# Patient Record
Sex: FEMALE | Race: WHITE | Employment: PART TIME | URBAN - METROPOLITAN AREA
[De-identification: names, ages, dates, MRNs, and addresses within clinical notes are randomized per-mention and may not be internally consistent; named-entity substitution may affect disease eponyms.]

---

## 2019-07-29 ENCOUNTER — TRANSFERRED RECORDS (OUTPATIENT)
Dept: HEALTH INFORMATION MANAGEMENT | Facility: CLINIC | Age: 34
End: 2019-07-29

## 2020-03-02 ENCOUNTER — OFFICE VISIT (OUTPATIENT)
Dept: PODIATRY | Facility: CLINIC | Age: 35
End: 2020-03-02
Payer: COMMERCIAL

## 2020-03-02 VITALS
HEIGHT: 68 IN | OXYGEN SATURATION: 100 % | WEIGHT: 139 LBS | HEART RATE: 74 BPM | BODY MASS INDEX: 21.07 KG/M2 | DIASTOLIC BLOOD PRESSURE: 64 MMHG | SYSTOLIC BLOOD PRESSURE: 102 MMHG

## 2020-03-02 DIAGNOSIS — M77.8 CAPSULITIS OF LEFT FOOT: Primary | ICD-10-CM

## 2020-03-02 PROCEDURE — 99202 OFFICE O/P NEW SF 15 MIN: CPT | Performed by: PODIATRIST

## 2020-03-02 RX ORDER — CLINDAMYCIN PHOSPHATE 11.9 MG/ML
SOLUTION TOPICAL
COMMUNITY
Start: 2019-07-23 | End: 2022-11-09

## 2020-03-02 ASSESSMENT — MIFFLIN-ST. JEOR: SCORE: 1379

## 2020-03-02 ASSESSMENT — PAIN SCALES - GENERAL: PAINLEVEL: NO PAIN (1)

## 2020-03-02 NOTE — PROGRESS NOTES
No past medical history on file.  There is no problem list on file for this patient.    No past surgical history on file.  Social History     Socioeconomic History     Marital status:      Spouse name: Not on file     Number of children: Not on file     Years of education: Not on file     Highest education level: Not on file   Occupational History     Not on file   Social Needs     Financial resource strain: Not on file     Food insecurity:     Worry: Not on file     Inability: Not on file     Transportation needs:     Medical: Not on file     Non-medical: Not on file   Tobacco Use     Smoking status: Not on file   Substance and Sexual Activity     Alcohol use: Not on file     Drug use: Not on file     Sexual activity: Not on file   Lifestyle     Physical activity:     Days per week: Not on file     Minutes per session: Not on file     Stress: Not on file   Relationships     Social connections:     Talks on phone: Not on file     Gets together: Not on file     Attends Sikh service: Not on file     Active member of club or organization: Not on file     Attends meetings of clubs or organizations: Not on file     Relationship status: Not on file     Intimate partner violence:     Fear of current or ex partner: Not on file     Emotionally abused: Not on file     Physically abused: Not on file     Forced sexual activity: Not on file   Other Topics Concern     Not on file   Social History Narrative     Not on file     No family history on file.  SUBJECTIVE FINDINGS:  A 35-year-old female presents for left foot pain.  She relates it has been going on for about 2 years.  It kind of hurts in the third and fourth, maybe the fifth toe and maybe the fifth MPJ.  She relates it comes and goes.  No injuries.  If she wears a tight shoe or a shoe that is narrow, that makes it worse.  She tried a toe sleeve that did not really help much.  She has seen her primary physician, who took x-rays and thought maybe she had a  agustín.      OBJECTIVE FINDINGS:  DP and PT are 2/4 left.  She has functional hallux limitus with a dorsal first MPJ prominence.  She has a dorsal mid-foot prominence.  She has dorsally-contracted digits 2-5.  There are no gross tendon voids.  No erythema, no drainage, no odor, no calor.  She has no palpable click or shooting pain in the interspaces with pinch and squeeze test on the left.  She does have some mild pain on palpation of the plantar fourth MPJ.  There is no pain on range of motion.  No gross tendon voids.  X-rays from 07/29/2019 were reviewed with patient in clinic today.  There is no fracture.  Joint cortical margins are intact.      ASSESSMENT AND PLAN:  Capsulitis, left fourth MPJ.  She does have functional hallux limitus and hammertoes present.  Diagnosis and treatment options discussed with the patient.   Spenco over-the-counter insoles advised and use discussed with her.  Metatarsal pads dispensed and use discussed with her.  She is advised on stretching and shoe gear.  Return to clinic and see me as needed.

## 2020-03-02 NOTE — NURSING NOTE
"Michelle Espino's chief complaint for this visit includes:  Chief Complaint   Patient presents with     Left Foot - Pain     Present for 2 years.     PCP: Warner Ansari    Referring Provider:  No referring provider defined for this encounter.    /64 (BP Location: Left arm, Patient Position: Sitting, Cuff Size: Adult Regular)   Pulse 74   Ht 1.727 m (5' 8\")   Wt 63 kg (139 lb)   SpO2 100%   BMI 21.13 kg/m    No Pain (1)     Do you need any medication refills at today's visit? No    Inga Flores CMA        "

## 2020-03-02 NOTE — LETTER
3/2/2020         RE: Michelle Espino  [address] [place] [address]        Dear Colleague,    Thank you for referring your patient, Michelle Espino, to the [facility]. Please see a copy of my visit note below.    No past medical history on file.  There is no problem list on file for this patient.    No past surgical history on file.  Social History     Socioeconomic History     Marital status:      Spouse name: Not on file     Number of children: Not on file     Years of education: Not on file     Highest education level: Not on file   Occupational History     Not on file   Social Needs     Financial resource strain: Not on file     Food insecurity:     Worry: Not on file     Inability: Not on file     Transportation needs:     Medical: Not on file     Non-medical: Not on file   Tobacco Use     Smoking status: Not on file   Substance and Sexual Activity     Alcohol use: Not on file     Drug use: Not on file     Sexual activity: Not on file   Lifestyle     Physical activity:     Days per week: Not on file     Minutes per session: Not on file     Stress: Not on file   Relationships     Social connections:     Talks on phone: Not on file     Gets together: Not on file     Attends Bahai service: Not on file     Active member of club or organization: Not on file     Attends meetings of clubs or organizations: Not on file     Relationship status: Not on file     Intimate partner violence:     Fear of current or ex partner: Not on file     Emotionally abused: Not on file     Physically abused: Not on file     Forced sexual activity: Not on file   Other Topics Concern     Not on file   Social History Narrative     Not on file     No family history on file.  SUBJECTIVE FINDINGS:  A 35-year-old female presents for left foot pain.  She relates it has been going on for about 2 years.  It kind of hurts in the third and fourth, maybe the fifth toe and maybe the fifth MPJ.  She relates it comes  and goes.  No injuries.  If she wears a tight shoe or a shoe that is narrow, that makes it worse.  She tried a toe sleeve that did not really help much.  She has seen her primary physician, who took x-rays and thought maybe she had a bunion.      OBJECTIVE FINDINGS:  DP and PT are 2/4 left.  She has functional hallux limitus with a dorsal first MPJ prominence.  She has a dorsal mid-foot prominence.  She has dorsally-contracted digits 2-5.  There are no gross tendon voids.  No erythema, no drainage, no odor, no calor.  She has no palpable click or shooting pain in the interspaces with pinch and squeeze test on the left.  She does have some mild pain on palpation of the plantar fourth MPJ.  There is no pain on range of motion.  No gross tendon voids.  X-rays from 07/29/2019 were reviewed with patient in clinic today.  There is no fracture.  Joint cortical margins are intact.      ASSESSMENT AND PLAN:  Capsulitis, left fourth MPJ.  She does have functional hallux limitus and hammertoes present.  Diagnosis and treatment options discussed with the patient.   Spenco over-the-counter insoles advised and use discussed with her.  Metatarsal pads dispensed and use discussed with her.  She is advised on stretching and shoe gear.  Return to clinic and see me as needed.           Again, thank you for allowing me to participate in the care of your patient.        Sincerely,        Adrian George DPM

## 2020-11-17 ENCOUNTER — TRANSFERRED RECORDS (OUTPATIENT)
Dept: HEALTH INFORMATION MANAGEMENT | Facility: CLINIC | Age: 35
End: 2020-11-17

## 2020-11-20 ENCOUNTER — OFFICE VISIT (OUTPATIENT)
Dept: OTOLARYNGOLOGY | Facility: CLINIC | Age: 35
End: 2020-11-20
Payer: COMMERCIAL

## 2020-11-20 VITALS
HEART RATE: 74 BPM | HEIGHT: 69 IN | BODY MASS INDEX: 18.51 KG/M2 | DIASTOLIC BLOOD PRESSURE: 75 MMHG | RESPIRATION RATE: 20 BRPM | WEIGHT: 125 LBS | SYSTOLIC BLOOD PRESSURE: 111 MMHG | OXYGEN SATURATION: 100 %

## 2020-11-20 DIAGNOSIS — J41.1 MUCOPURULENT CHRONIC BRONCHITIS (H): Primary | ICD-10-CM

## 2020-11-20 DIAGNOSIS — D10.5 PAPILLOMA OF OROPHARYNX: ICD-10-CM

## 2020-11-20 DIAGNOSIS — J35.01 CHRONIC TONSILLITIS: ICD-10-CM

## 2020-11-20 PROCEDURE — 99203 OFFICE O/P NEW LOW 30 MIN: CPT | Performed by: OTOLARYNGOLOGY

## 2020-11-20 ASSESSMENT — ENCOUNTER SYMPTOMS
SORE THROAT: 0
STRIDOR: 0
HEMOPTYSIS: 0
HEARTBURN: 0
TINGLING: 0
BLURRED VISION: 0
DOUBLE VISION: 0
VOMITING: 0
COUGH: 1
HEADACHES: 0
SHORTNESS OF BREATH: 0
CONSTITUTIONAL NEGATIVE: 1
SINUS PAIN: 0
BRUISES/BLEEDS EASILY: 0
WHEEZING: 0
DIZZINESS: 0
NAUSEA: 0
TREMORS: 0
SPUTUM PRODUCTION: 1

## 2020-11-20 ASSESSMENT — MIFFLIN-ST. JEOR: SCORE: 1331.38

## 2020-11-20 ASSESSMENT — PAIN SCALES - GENERAL: PAINLEVEL: MILD PAIN (3)

## 2020-11-20 NOTE — PROGRESS NOTES
HPI    This pleasant patient is having productive cough episodes and bring up a small colored calcified tissue for the past 3-4 months. Coughing episodes occur all of a sudden and ends up with a calcified production. States sore throat. Denies any fever, shortness of breath, weight changes, voice changes, dysphagia or odynophagia. 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 Denies any environmental allergies, head and neck surgeries or tonsil infections.    Review of Systems   Constitutional: Negative.    HENT: Negative for congestion, ear discharge, ear pain, hearing loss, nosebleeds, sinus pain, sore throat and tinnitus.    Eyes: Negative for blurred vision and double vision.   Respiratory: Positive for cough and sputum production. Negative for hemoptysis, shortness of breath, wheezing and stridor.    Gastrointestinal: Negative for heartburn, nausea and vomiting.   Skin: Negative.    Neurological: Negative for dizziness, tingling, tremors and headaches.   Endo/Heme/Allergies: Negative for environmental allergies. Does not bruise/bleed easily.         Physical Exam  Vitals signs reviewed.   HENT:      Head: Normocephalic and atraumatic.      Jaw: There is normal jaw occlusion.      Right Ear: Hearing, tympanic membrane, ear canal and external ear normal. No decreased hearing noted. No middle ear effusion. There is no impacted cerumen.      Left Ear: Hearing, tympanic membrane, ear canal and external ear normal. No decreased hearing noted.  No middle ear effusion. There is no impacted cerumen.      Nose: Nose normal. No congestion or rhinorrhea.      Mouth/Throat:      Mouth: Mucous membranes are moist.      Tongue: No lesions. Tongue does not deviate from midline.      Palate: No mass and lesions.      Pharynx: Uvula midline. No pharyngeal swelling, oropharyngeal exudate, posterior oropharyngeal erythema or uvula swelling.      Tonsils: No  tonsillar exudate or tonsillar abscesses.   Eyes:      Extraocular Movements: Extraocular movements intact.      Pupils: Pupils are equal, round, and reactive to light.   Neurological:      Mental Status: She is alert.       Bilateral papillomatous growth located at the upper pole of the tonsillar region.  No acute infection.    A/P  This pleasant patient is likely having tonsil stones and she has bilateral papillary epithelial growths at the upper poles of the tonsillar cavity. Options were discussed. I will refer her to a Pulmonologist to r/o any asthma or bronchitis first and see her recent upper endoscopy note. She will be scheduled for bilateral tonsillectomy+ oropharyngeal lesion removal. Pros and cons of the surgery were explained. Her questions were answered. Greater than 30 minutes were spent on this visit 50% of which were spent on counseling and coordinating of care.

## 2020-11-20 NOTE — NURSING NOTE
"Michelle Espino's goals for this visit include:   Chief Complaint   Patient presents with     Consult     Pt said has had sore throat on and off for about 4 months. Has coughed up chunks too- brown in color- like tonsil stones. Was told by another ENT who did a scope that she has \"textbook squamous papiloma\"      She requests these members of her care team be copied on today's visit information:     PCP: Warner Ansari    Referring Provider:  No referring provider defined for this encounter.    /75 (BP Location: Right arm, Patient Position: Sitting, Cuff Size: Adult Regular)   Pulse 74   Resp 20   Ht 1.753 m (5' 9\")   Wt 56.7 kg (125 lb)   SpO2 100%   BMI 18.46 kg/m      Do you need any medication refills at today's visit? No    Janie Tuttle LPN      "

## 2020-11-20 NOTE — LETTER
11/20/2020         RE: Michelle Espino  [address] [place] [address]        Dear Colleague,    Thank you for referring your patient, Michelle Espino, to the [facility]. Please see a copy of my visit note below.    HPI    This pleasant patient is having productive cough episodes and bring up a small colored calcified tissue for the past 3-4 months. Coughing episodes occur all of a sudden and ends up with a calcified production. States sore throat. Denies any fever, shortness of breath, weight changes, voice changes, dysphagia or odynophagia. 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 Denies any environmental allergies, head and neck surgeries or tonsil infections.    Review of Systems   Constitutional: Negative.    HENT: Negative for congestion, ear discharge, ear pain, hearing loss, nosebleeds, sinus pain, sore throat and tinnitus.    Eyes: Negative for blurred vision and double vision.   Respiratory: Positive for cough and sputum production. Negative for hemoptysis, shortness of breath, wheezing and stridor.    Gastrointestinal: Negative for heartburn, nausea and vomiting.   Skin: Negative.    Neurological: Negative for dizziness, tingling, tremors and headaches.   Endo/Heme/Allergies: Negative for environmental allergies. Does not bruise/bleed easily.         Physical Exam  Vitals signs reviewed.   HENT:      Head: Normocephalic and atraumatic.      Jaw: There is normal jaw occlusion.      Right Ear: Hearing, tympanic membrane, ear canal and external ear normal. No decreased hearing noted. No middle ear effusion. There is no impacted cerumen.      Left Ear: Hearing, tympanic membrane, ear canal and external ear normal. No decreased hearing noted.  No middle ear effusion. There is no impacted cerumen.      Nose: Nose normal. No congestion or rhinorrhea.      Mouth/Throat:      Mouth: Mucous membranes are moist.       Tongue: No lesions. Tongue does not deviate from midline.      Palate: No mass and lesions.      Pharynx: Uvula midline. No pharyngeal swelling, oropharyngeal exudate, posterior oropharyngeal erythema or uvula swelling.      Tonsils: No tonsillar exudate or tonsillar abscesses.   Eyes:      Extraocular Movements: Extraocular movements intact.      Pupils: Pupils are equal, round, and reactive to light.   Neurological:      Mental Status: She is alert.       Bilateral papillomatous growth located at the upper pole of the tonsillar region.  No acute infection.    A/P  This pleasant patient is likely having tonsil stones and she has bilateral papillary epithelial growths at the upper poles of the tonsillar cavity. Options were discussed. I will refer her to a Pulmonologist to r/o any asthma or bronchitis first and see her recent upper endoscopy note. She will be scheduled for bilateral tonsillectomy+ oropharyngeal lesion removal. Pros and cons of the surgery were explained. Her questions were answered. Greater than 30 minutes were spent on this visit 50% of which were spent on counseling and coordinating of care.          Again, thank you for allowing me to participate in the care of your patient.        Sincerely,        Rikki Arshad MD

## 2020-12-30 ENCOUNTER — OFFICE VISIT (OUTPATIENT)
Dept: NURSING | Facility: CLINIC | Age: 35
End: 2020-12-30
Payer: COMMERCIAL

## 2020-12-30 VITALS — HEART RATE: 73 BPM | WEIGHT: 124.4 LBS | BODY MASS INDEX: 18.37 KG/M2 | OXYGEN SATURATION: 95 %

## 2020-12-30 DIAGNOSIS — R05.9 COUGH: Primary | ICD-10-CM

## 2020-12-30 PROCEDURE — 99207 PR DROP WITH A PROCEDURE: CPT | Performed by: INTERNAL MEDICINE

## 2020-12-30 PROCEDURE — 94726 PLETHYSMOGRAPHY LUNG VOLUMES: CPT | Performed by: INTERNAL MEDICINE

## 2020-12-30 PROCEDURE — 94729 DIFFUSING CAPACITY: CPT | Performed by: INTERNAL MEDICINE

## 2020-12-30 PROCEDURE — 94060 EVALUATION OF WHEEZING: CPT | Performed by: INTERNAL MEDICINE

## 2021-01-04 ENCOUNTER — HEALTH MAINTENANCE LETTER (OUTPATIENT)
Age: 36
End: 2021-01-04

## 2021-01-04 LAB
DLCOUNC-%PRED-PRE: 90 %
DLCOUNC-PRE: 23.1 ML/MIN/MMHG
DLCOUNC-PRED: 25.59 ML/MIN/MMHG
ERV-%PRED-PRE: 101 %
ERV-PRE: 1.61 L
ERV-PRED: 1.59 L
EXPTIME-PRE: 6.81 SEC
FEF2575-%PRED-POST: 73 %
FEF2575-%PRED-PRE: 64 %
FEF2575-POST: 2.76 L/SEC
FEF2575-PRE: 2.41 L/SEC
FEF2575-PRED: 3.75 L/SEC
FEFMAX-%PRED-PRE: 79 %
FEFMAX-PRE: 6.13 L/SEC
FEFMAX-PRED: 7.69 L/SEC
FEV1-%PRED-PRE: 78 %
FEV1-PRE: 2.84 L
FEV1FEV6-PRE: 77 %
FEV1FEV6-PRED: 85 %
FEV1FVC-PRE: 77 %
FEV1FVC-PRED: 83 %
FEV1SVC-PRE: 78 %
FEV1SVC-PRED: 83 %
FIFMAX-PRE: 2.81 L/SEC
FRCPLETH-%PRED-PRE: 104 %
FRCPLETH-PRE: 3.06 L
FRCPLETH-PRED: 2.92 L
FVC-%PRED-PRE: 84 %
FVC-PRE: 3.7 L
FVC-PRED: 4.36 L
IC-%PRED-PRE: 73 %
IC-PRE: 2.04 L
IC-PRED: 2.77 L
RVPLETH-%PRED-PRE: 86 %
RVPLETH-PRE: 1.45 L
RVPLETH-PRED: 1.68 L
TLCPLETH-%PRED-PRE: 90 %
TLCPLETH-PRE: 5.1 L
TLCPLETH-PRED: 5.65 L
VA-%PRED-PRE: 84 %
VA-PRE: 4.92 L
VC-%PRED-PRE: 83 %
VC-PRE: 3.65 L
VC-PRED: 4.36 L

## 2021-01-11 ENCOUNTER — TELEPHONE (OUTPATIENT)
Dept: PULMONOLOGY | Facility: CLINIC | Age: 36
End: 2021-01-11

## 2021-01-11 NOTE — TELEPHONE ENCOUNTER
Contacted [facility] @ [phone]. Requested CXR from 11/10/2020 be pushed to  PACS. Images should be available this afternoon for review per imaging staff. Report is available in Care Everywhere.      Emily Sandhu LPN  Pulmonary Division:  [facility]  Phone: [phone] Fax: [phone]

## 2021-01-13 ENCOUNTER — VIRTUAL VISIT (OUTPATIENT)
Dept: PULMONOLOGY | Facility: CLINIC | Age: 36
End: 2021-01-13
Attending: OTOLARYNGOLOGY
Payer: COMMERCIAL

## 2021-01-13 DIAGNOSIS — R05.3 CHRONIC COUGH: Primary | ICD-10-CM

## 2021-01-13 DIAGNOSIS — J41.1 MUCOPURULENT CHRONIC BRONCHITIS (H): ICD-10-CM

## 2021-01-13 PROCEDURE — 99204 OFFICE O/P NEW MOD 45 MIN: CPT | Mod: 95 | Performed by: INTERNAL MEDICINE

## 2021-01-13 RX ORDER — AZELAIC ACID 0.15 G/G
AEROSOL, FOAM TOPICAL
COMMUNITY
Start: 2020-12-03 | End: 2022-11-09

## 2021-01-13 NOTE — PATIENT INSTRUCTIONS
Follow up with me virtually in 3 months. You may contact the pulmonary clinic at [phone] in order to schedule a follow up.

## 2021-01-13 NOTE — PROGRESS NOTES
"Michelle is a 35 year old who is being evaluated via a billable video visit.      How would you like to obtain your AVS? MyChart  If the video visit is dropped, the invitation should be resent by: Text to cell phone: Mobile  Will anyone else be joining your video visit? No      Video Start Time: 8:45 AM      Subjective     iMchelle is a 35 year old who presents to clinic today for chronic productive cough        HPI   I have the pleasure of seeing Ms. Michelle Espino, who is a very pleasant 35 yr old female who presents with complains of productive cough for about 4 months. Cough episodes are somewhat explosive, occurs suddenly and are productive of very dark brown thick phlegm with occasional calcified tissues. This is usually accompanied by recurrent sore throat and a \"feeling of something in her throat\". She was treated in the past with antibiotics with no improvement.   She denies any fever, wheezing, shortness of breath, weight changes, voice changes, dysphagia or odynophagia. She was referred to ENT at [place] - Dr. Jack who performed a diagnostic endoscopy which revealed tonsillar pits/crypts and bilateral papillomatous growth located at the upper pole of the tonsillar region. These findings were thought to be the culprit for her symptoms and bilateral tonsillectomy+ oropharyngeal lesion removal was recommended. This was also seconded by [address] [facility]- Dr. Arshad who she saw recently in 12/2020.  She was referred here to see if there are any alternative culprit such as asthma which can explain her symptoms.  She describes periodic episodes of feeling \"sick\" which she describes as nasal congestion which eventually progresses to chest congestion with accompanying coughing. She is not sure if she wheezes. She attributes this to a common cold/viral illness as she has kids in .   Questionable history of asthma in the past and has used inhalers but not using any now.     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  -    Review of Systems   Constitutional, HEENT, cardiovascular, pulmonary, GI, , musculoskeletal, neuro, skin, endocrine and psych systems are negative, except as otherwise noted.      Objective           Vitals:  No vitals were obtained today due to virtual visit.    Physical Exam   GENERAL: Healthy, alert and no distress  EYES: Eyes grossly normal to inspection.  No discharge or erythema, or obvious scleral/conjunctival abnormalities.  RESP: No audible wheeze, cough, or visible cyanosis.  No visible retractions or increased work of breathing.    SKIN: Visible skin clear. No significant rash, abnormal pigmentation or lesions.  NEURO: Cranial nerves grossly intact.  Mentation and speech appropriate for age.  PSYCH: Mentation appears normal, affect normal/bright, judgement and insight intact, normal speech and appearance well-groomed.    Most Recent [facility] Pulmonary Function Testing  Reviewed and discussed with patient - No obstruction    FVC-Pred   Date Value Ref Range Status   12/30/2020 4.36 L      FVC-Pre   Date Value Ref Range Status   12/30/2020 3.70 L      FVC-%Pred-Pre   Date Value Ref Range Status   12/30/2020 84 %      FEV1-Pre   Date Value Ref Range Status   12/30/2020 2.84 L      FEV1-%Pred-Pre   Date Value Ref Range Status   12/30/2020 78 %      FEV1FVC-Pred   Date Value Ref Range Status   12/30/2020 83 %      FEV1FVC-Pre   Date Value Ref Range Status   12/30/2020 77 %      No results found for: 20029  FEFMax-Pred   Date Value Ref Range Status   12/30/2020 7.69 L/sec      FEFMax-Pre   Date Value Ref Range Status   12/30/2020 6.13 L/sec      FEFMax-%Pred-Pre   Date Value Ref Range Status   12/30/2020 79 %      ExpTime-Pre   Date Value Ref Range Status   12/30/2020 6.81 sec      FIFMax-Pre   Date Value Ref Range Status   12/30/2020 2.81 L/sec      FEV1FEV6-Pred   Date Value Ref Range  "Status   12/30/2020 85 %      FEV1FEV6-Pre   Date Value Ref Range Status   12/30/2020 77 %      No results found for: 20055    Assessment and Plan    Chronic Cough  I reviewed her PFTs and CXR in details with the patient and they are reassuring. In the context of normal CXR(hyperexpanded lungs), normal PFTs and history/symptoms, I agree with the ENT findings and assessments. Her symptoms are likely due to the abnormal upper airway findings related to her tonsils and she should move forward with surgery.  She has a questionable history of asthma and has used inhalers in the past. Her PFTs do not show any clinically significant obstruction but her CXR shows hyperexpanded lungs which could portend asthma. Her symptoms are unlikely due to asthma.   I explained to Michelle, the importance of being on a maintenance ICS inhaler for asthma. She describes periodic episodes of feeling \"sick\" which happens fairly often. Her description does not appear like asthma flares although I worry that she may have poor perception of her symptoms as it relates to typical symptoms of uncontrolled asthma. 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 We will have another conversation about ICS when she is done with breastfeeding.   If the surgery is performed and her symptoms are still persistent, then I will order a CT Chest and consider performing a bronchoscopy after a trial of an ICS inhaler.      Video-Visit Details    Type of service:  Video Visit    Video End Time:9:42 AM    Originating Location (pt. Location): Home    Distant Location (provider location):  [facility]     Platform used for Video Visit: [place]  "

## 2021-01-13 NOTE — RESULT ENCOUNTER NOTE
Results discussed directly with patient while patient was present. Any further details documented in the note.   Belen Osborn MD

## 2021-10-10 ENCOUNTER — HEALTH MAINTENANCE LETTER (OUTPATIENT)
Age: 36
End: 2021-10-10

## 2022-01-14 ENCOUNTER — THERAPY VISIT (OUTPATIENT)
Dept: PHYSICAL THERAPY | Facility: CLINIC | Age: 37
End: 2022-01-14
Payer: COMMERCIAL

## 2022-01-14 DIAGNOSIS — M79.621 PAIN OF RIGHT UPPER ARM: ICD-10-CM

## 2022-01-14 PROCEDURE — 97161 PT EVAL LOW COMPLEX 20 MIN: CPT | Mod: GP | Performed by: PHYSICAL THERAPIST

## 2022-01-14 PROCEDURE — 97110 THERAPEUTIC EXERCISES: CPT | Mod: GP | Performed by: PHYSICAL THERAPIST

## 2022-01-14 NOTE — PROGRESS NOTES
[facility] Initial Evaluation -- Upper Extremity    Evaluation Date: January 14, 2022  Michelle Espino is a 36 year old female with a R arm condition.   Referral: Dr. Ansari  Work mechanical stresses: computer work  Employment status:  Full time  Leisure mechanical stresses: family/children -  2 yo and 3 yo  Functional disability score (SPADI): 44.62  VAS score (0-10): 1-2/10 at rest, at worst 8/10  Handedness (R/L):  R  Patient goals/expectations:  To be able to use her arm without pain or difficulty.    HISTORY    Present symptoms: dull ache anterior lateral arm.    Pain quality (sharp/shooting/stabbing/aching/burning/cramping):  Ache at rest, and intermittent sharp shooting pain with movement    Present since (onset date):  1/23/2021    Symptoms (improving/unchanging/worsening):  unchanging.    Symptoms commenced as a result of: She had her flu shot and had immediate pain with the injection.   Condition occurred in the following environment: clinic    Symptoms at onset: anterior lateral arm - deep ache  Paresthesia (yes/no):  no  Spinal history: no   Cough/Sneeze (pos/neg):  no    Constant symptoms: ache anterior/lateral proximal arm  Intermittent symptoms: sharp shooting pain    Symptoms are worse with the following: Always Dressing, Always Reaching, Always On the move, Sometimes Sleeping (prone/sup/side R/L) - only if she is having increased pain after using the arm repetitively, Time of day - No effect and Other - lifting   Symptoms are better with the following: Always When still (for the sharp and shooting pain, but does not impact the dull ache  Did trial Ibuprofen helped about 70% while taking the medication ( no lasting relief)  Continued use makes the pain (better/worse/no effect): worse    Disturbed night (yes/no):  No longer - only if really flared up    Pain at rest (yes/no): yes  Site (neck/shoulder/elbow/wrist/hand): shoulder    Other questions  (swelling/catching/clicking/locking/subluxing):  none    Previous episodes: none  Previous treatments: did trial Ibuprofen which helped during the trial    Specific Questions:  General health (excellent/good/fair/poor):  Excellent/good  Pertinent medical history includes: None  Medications (nil/NSAIDS/analg/steroids/anticoag/other):  NSAIDS - as needed  Medical allergies:  Amoxicillin, Cefdinir, Cefuroxime  Imaging (None/Xray/MRI/Other): none  Recent or major surgery (yes/no): no  Night pain (yes/no): no  Accidents (yes/no): no  Unexplained weight loss (yes/no): no  Barriers at home: no  Other red flags: no    Sites for physical examination (neck/shoulder/elbow/wrist/hand): shoulder.     EXAMINATION    Posture:  Sitting (good/fair/poor): fair  Correction of posture (better/worse/no effect/NA): no effect   Standing (good/fair/poor): good  Other observations:  `none    Neurological (NA/motor/sensory/reflexes/dural): normal motor, symmetrical sensation to light touch, did not assess reflexes or dural tension     Baselines (pain or functional activity): PDM with flexion, flexion ROM    Extremities (Shoulder/Elbow/Wrist/Hand): R shoulder (full L shoulder)    Movement Loss Willy Mod Min Nil Pain   Flexion     145 ++   Extension     65   Abduction     151 ++    Internal Rotation     Symmetrical and full   External Rotation     90 deg - symmetrical and painfree    Supination        Pronation        Radial Deviation        Ulnar Deviation           Passive Movement (+/- overpressure)/(PDM/ERP):  Min loss of shoulder flexion due to pain  Resisted Test Response (pain): strong and pain free MMT  Other Tests:   (-) Palak Gu  (-) Neer's  (-) RCT  Not tender to palpation    Spine:  Movement Loss: none  Effect of repeated movements:   Effect of static positioning:   Spine testing (not relevant/relevant/secondary problem): not relevant    Baseline Symptoms: ache 1-2/10 lat/anterior proximal R arm (about deltoid insertion or a  bit more proximal  Repeated Tests Symptom Response Mechanical Response   Active/Passive movement, resisted test, functional test During - Produce, Abolish, Increase, Decrease, NE After -   Better, Worse, NB, NW, NE Effect -   ? or ? ROM, strength or key functional test No Effect   Passive shoulder extension  No Effect  Inc ROM with reps    Better    Inc flexion ROM with PDM beginning later in the range of motion                          Effect of static positioning                  Provisional Classification (Extremity/Spine): Extremity - Derangement versus Inconclusive/Other  - favorable response to shoulder extension today     Principle of Management:  Education:  Discussed trial of shoulder extension as this did allow shoulder flexion to improve and she had less pain during motion.  Set baselines = symptoms at rest, amount of pain with flexion and when pain starts with flexion.  Do the exercise and  Re-assess the baseline measures.  Goal to alleviate  Equipment provided:  none  Exercise and dosage:  Passive shoulder extension 10 reps 4-6 times     ASSESSMENT/PLAN:    Patient is a 36 year old female with right side shoulder complaints.    Patient has the following significant findings with corresponding treatment plan.                Diagnosis 1:  R arm pain    Pain -  self management, education, directional preference exercise and home program.    Decreased ROM/flexibility - manual therapy, therapeutic exercise and home program  Decreased function - therapeutic activities and home program    Therapy Evaluation Codes:   1) History comprised of:   Personal factors that impact the plan of care:      None.    Comorbidity factors that impact the plan of care are:      None.     Medications impacting care: None.  2) Examination of Body Systems comprised of:   Body structures and functions that impact the plan of care:      Shoulder.   Activity limitations that impact the plan of care are:      Bathing, Dressing,  Lifting and reaching.  3) Clinical presentation characteristics are:   Stable/Uncomplicated.  4) Decision-Making    Moderate complexity using standardized patient assessment instrument and/or measureable assessment of functional outcome.  Cumulative Therapy Evaluation is: Low complexity.    Previous and current functional limitations:  (See Goal Flow Sheet for this information)    Short term and Long term goals: (See Goal Flow Sheet for this information)     Communication ability:  Patient appears to be able to clearly communicate and understand verbal and written communication and follow directions correctly.  Treatment Explanation - The following has been discussed with the patient:   RX ordered/plan of care  Anticipated outcomes  Possible risks and side effects  This patient would benefit from PT intervention to resume normal activities.   Rehab potential is good.    Frequency:  1-2 X week, once daily  Duration:  for 6 visits  Discharge Plan:  Achieve all LTG.  Independent in home treatment program.  Reach maximal therapeutic benefit.    Please refer to the daily flowsheet for treatment today, total treatment time and time spent performing 1:1 timed codes.

## 2022-01-14 NOTE — LETTER
[facility]  [address]  [facility] [address]  [phone]    2022    Re: Michelle Espino   :   1985  MRN:  [id number]   REFERRING PHYSICIAN:   MD MARY Khan [facility]    Date of Initial Evaluation:  22  Visits:  Rxs Used: 1  Reason for Referral:  Pain of right upper arm    EVALUATION SUMMARY    [facility] Initial Evaluation -- Upper Extremity    Evaluation Date: 2022  Michelle Espino is a 36 year old female with a R arm condition.   Referral: Dr. Ansari  Work mechanical stresses: computer work  Employment status:  Full time  Leisure mechanical stresses: family/children -  2 yo and 3 yo  Functional disability score (SPADI): 44.62  VAS score (0-10): 1-2/10 at rest, at worst 8/10  Handedness (R/L):  R  Patient goals/expectations:  To be able to use her arm without pain or difficulty.    HISTORY    Present symptoms: dull ache anterior lateral arm.    Pain quality (sharp/shooting/stabbing/aching/burning/cramping):  Ache at rest, and intermittent sharp shooting pain with movement    Present since (onset date):  2021    Symptoms (improving/unchanging/worsening):  unchanging.    Symptoms commenced as a result of: She had her flu shot and had immediate pain with the injection.   Condition occurred in the following environment: clinic    Symptoms at onset: anterior lateral arm - deep ache  Paresthesia (yes/no):  no  Spinal history: no   Cough/Sneeze (pos/neg):  no    Constant symptoms: ache anterior/lateral proximal arm  Intermittent symptoms: sharp shooting pain      Re: Michelle Espino   :   1985    Symptoms are worse with the following: Always Dressing, Always Reaching, Always On the move, Sometimes Sleeping (prone/sup/side R/L) - only if she is having increased pain after using the arm repetitively, Time of day - No effect and Other - lifting   Symptoms  are better with the following: Always When still (for the sharp and shooting pain, but does not impact the dull ache  Did trial Ibuprofen helped about 70% while taking the medication ( no lasting relief)  Continued use makes the pain (better/worse/no effect): worse    Disturbed night (yes/no):  No longer - only if really flared up    Pain at rest (yes/no): yes  Site (neck/shoulder/elbow/wrist/hand): shoulder    Other questions (swelling/catching/clicking/locking/subluxing):  none    Previous episodes: none  Previous treatments: did trial Ibuprofen which helped during the trial    Specific Questions:  General health (excellent/good/fair/poor):  Excellent/good  Pertinent medical history includes: None  Medications (nil/NSAIDS/analg/steroids/anticoag/other):  NSAIDS - as needed  Medical allergies:  Amoxicillin, Cefdinir, Cefuroxime  Imaging (None/Xray/MRI/Other): none  Recent or major surgery (yes/no): no  Night pain (yes/no): no  Accidents (yes/no): no  Unexplained weight loss (yes/no): no  Barriers at home: no  Other red flags: no    Sites for physical examination (neck/shoulder/elbow/wrist/hand): shoulder.     EXAMINATION    Posture:  Sitting (good/fair/poor): fair  Correction of posture (better/worse/no effect/NA): no effect   Standing (good/fair/poor): good  Other observations:  `none    Neurological (NA/motor/sensory/reflexes/dural): normal motor, symmetrical sensation to light touch, did not assess reflexes or dural tension     Baselines (pain or functional activity): PDM with flexion, flexion ROM    Extremities (Shoulder/Elbow/Wrist/Hand): R shoulder (full L shoulder)    Movement Loss Willy Mod Min Nil Pain   Flexion     145 ++     Re: Michelle Espino   :   1985    Extension             65   Abduction     151 ++    Internal Rotation     Symmetrical and full   External Rotation     90 deg - symmetrical and painfree    Supination        Pronation        Radial Deviation        Ulnar Deviation            Passive Movement (+/- overpressure)/(PDM/ERP):  Min loss of shoulder flexion due to pain  Resisted Test Response (pain): strong and pain free MMT  Other Tests:   (-) Palak Gu  (-) Neer's  (-) RCT  Not tender to palpation    Spine:  Movement Loss: none  Effect of repeated movements:   Effect of static positioning:   Spine testing (not relevant/relevant/secondary problem): not relevant    Baseline Symptoms: ache 1-2/10 lat/anterior proximal R arm (about deltoid insertion or a bit more proximal  Repeated Tests Symptom Response Mechanical Response   Active/Passive movement, resisted test, functional test During - Produce, Abolish, Increase, Decrease, NE After -   Better, Worse, NB, NW, NE Effect -   ? or ? ROM, strength or key functional test No Effect   Passive shoulder extension  No Effect  Inc ROM with reps    Better    Inc flexion ROM with PDM beginning later in the range of motion                          Effect of static positioning              Re: Michelle Espino   :   1985    Provisional Classification (Extremity/Spine): Extremity - Derangement versus Inconclusive/Other  - favorable response to shoulder extension today     Principle of Management:  Education:  Discussed trial of shoulder extension as this did allow shoulder flexion to improve and she had less pain during motion.  Set baselines = symptoms at rest, amount of pain with flexion and when pain starts with flexion.  Do the exercise and  Re-assess the baseline measures.  Goal to alleviate  Equipment provided:  none  Exercise and dosage:  Passive shoulder extension 10 reps 4-6 times     ASSESSMENT/PLAN:    Patient is a 36 year old female with right side shoulder complaints.    Patient has the following significant findings with corresponding treatment plan.                Diagnosis 1:  R arm pain    Pain -  self management, education, directional preference exercise and home program.    Decreased ROM/flexibility - manual therapy,  therapeutic exercise and home program  Decreased function - therapeutic activities and home program    Therapy Evaluation Codes:   1) History comprised of:   Personal factors that impact the plan of care:      None.    Comorbidity factors that impact the plan of care are:      None.     Medications impacting care: None.  2) Examination of Body Systems comprised of:   Body structures and functions that impact the plan of care:      Shoulder.   Activity limitations that impact the plan of care are:      Bathing, Dressing, Lifting and reaching.  3) Clinical presentation characteristics are:   Stable/Uncomplicated.  4) Decision-Making    Moderate complexity using standardized patient assessment instrument and/or measureable assessment of functional outcome.  Cumulative Therapy Evaluation is: Low complexity.    Previous and current functional limitations:  (See Goal Flow Sheet for this information)    Short term and Long term goals: (See Goal Flow Sheet for this information)     Communication ability:  Patient appears to be able to clearly communicate and understand verbal and written communication and follow directions correctly.  Treatment Explanation - The following has been discussed with the patient:   RX ordered/plan of care  Anticipated outcomes  Possible risks and side effects  Re: Michelle Espino   :   1985    This patient would benefit from PT intervention to resume normal activities.   Rehab potential is good.    Frequency:  1-2 X week, once daily  Duration:  for 6 visits  Discharge Plan:  Achieve all LTG.  Independent in home treatment program.  Reach maximal therapeutic benefit.    Thank you for your referral.    INQUIRIES  Therapist: Antoinette Kinsey PT  [facility]  [address]  [facility] [address]  Phone: [phone]  Fax: [phone]

## 2022-01-24 ENCOUNTER — THERAPY VISIT (OUTPATIENT)
Dept: PHYSICAL THERAPY | Facility: CLINIC | Age: 37
End: 2022-01-24
Payer: COMMERCIAL

## 2022-01-24 DIAGNOSIS — M79.621 PAIN OF RIGHT UPPER ARM: ICD-10-CM

## 2022-01-24 PROCEDURE — 97530 THERAPEUTIC ACTIVITIES: CPT | Mod: GP | Performed by: PHYSICAL THERAPIST

## 2022-01-24 PROCEDURE — 97110 THERAPEUTIC EXERCISES: CPT | Mod: GP | Performed by: PHYSICAL THERAPIST

## 2022-01-30 ENCOUNTER — HEALTH MAINTENANCE LETTER (OUTPATIENT)
Age: 37
End: 2022-01-30

## 2022-06-20 PROBLEM — M79.621 PAIN OF RIGHT UPPER ARM: Status: RESOLVED | Noted: 2022-01-14 | Resolved: 2022-06-20

## 2022-06-20 NOTE — PROGRESS NOTES
Discharge Note    Progress reporting period is from initial evaluation date (please see noted date below) to Jan 24, 2022.  Linked Episodes   Type: Episode: Status: Noted: Resolved: Last update: Updated by:   PHYSICAL THERAPY R shoulder/arm 1/14/2022 Active 1/14/2022 1/24/2022  2:09 PM Antoinette Kinsey, PT      Comments:       Michelle failed to follow up and current status is unknown.  Please see information below for last relevant information on current status.  Patient seen for 2 visits.    SUBJECTIVE  Subjective changes noted by patient:  Patient notes that she is feeling better at rest.  She did stop the exercise after doing the exercises for a few days.  Her symptoms are the same as the other side at rest today.    .  Current pain level is 3/10.     Previous pain level was  10/10.   Changes in function:  Yes (See Goal flowsheet attached for changes in current functional level)  Adverse reaction to treatment or activity: None    OBJECTIVE  Changes noted in objective findings:   (+) seated ULTT on the R and produced a dull throb/ache (3/10)    ERP with shoulder flexion and abduction   Tension with ULTT was altered after the neck extension where she did not feel the pull as her elbow was extending but symptoms remained constant     ASSESSMENT/PLAN  Diagnosis: R arm pain s/p flu shot   Updated problem list and treatment plan:   Pain - HEP  Decreased ROM/flexibility - HEP  Decreased function - HEP  STG/LTGs have been met or progress has been made towards goals:  Yes, please see goal flowsheet for most current information  Assessment of Progress: current status is unknown.    Last current status: Pt has not made progress   Self Management Plans:  HEP  I have re-evaluated this patient and find that the nature, scope, duration and intensity of the therapy is appropriate for the medical condition of the patient.  Michelle continues to require the following intervention to meet STG and LTG's:   HEP.    Recommendations:  Discharge with current home program.  Patient to follow up with MD as needed.    Please refer to the daily flowsheet for treatment today, total treatment time and time spent performing 1:1 timed codes.

## 2022-09-18 ENCOUNTER — HEALTH MAINTENANCE LETTER (OUTPATIENT)
Age: 37
End: 2022-09-18

## 2022-11-08 NOTE — PROGRESS NOTES
SUBJECTIVE:   Michelle Espino is a 37 year old female who is seen as self referral for evaluation of bilateral finger pain that has been present approximately 1 month. No known injury.    Present symptoms: problems started with the right side, with recent left sided issues  Both hands have had some pain, but mainly swelling, tightness with flexion, and some redness around the DIP joints dorsally only.   Right side involving fingers 2,3,5.  Left side mainly 5th finger and 3rd a bit.     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   Doesn't swim regularly, and is not exposed to chemical cleaning solutions.  No history of inflammatory joint disease. No history of psoriasis or other skin condition.    Review of Systems:  Constitutional:  NEGATIVE for fever, chills, change in weight  Integumentary/Skin:  NEGATIVE for worrisome rashes, moles or lesions  Eyes:  NEGATIVE for vision changes or irritation  ENT/Mouth:  NEGATIVE for ear, mouth and throat problems  Resp:  NEGATIVE for significant cough or SOB  Breast:  NEGATIVE for masses, tenderness or discharge  CV:  NEGATIVE for chest pain, palpitations or peripheral edema  GI:  NEGATIVE for nausea, abdominal pain, heartburn, or change in bowel habits  :  Negative   Musculoskeletal:  See HPI above  Neuro:  NEGATIVE for weakness, dizziness or paresthesias  Endocrine:  NEGATIVE for temperature intolerance, skin/hair changes  Heme/allergy/immune:  NEGATIVE for bleeding problems  Psychiatric:  NEGATIVE for changes in mood or affect    Past Medical History: No past medical history on file.  Past Surgical History: No past surgical history on file.  Family History: No family history on file.  Social History:   Social History     Tobacco Use     Smoking status: Never     Smokeless tobacco: Never   Substance Use Topics     Alcohol use: Not on file       OBJECTIVE:  Physical  "Exam:  /79 (BP Location: Left arm, Patient Position: Sitting, Cuff Size: Adult Regular)   Pulse 91   Ht 1.753 m (5' 9\")   Wt 54.4 kg (120 lb)   SpO2 100%   BMI 17.72 kg/m    General Appearance: healthy, alert and no distress   Skin: no suspicious lesions or rashes  Neuro: Normal strength and tone, mentation intact and speech normal  Vascular: good pulses, and cappillary refill   Lymph: no lymphadenopathy   Psych:  mentation appears normal and affect normal/bright  Resp: no increased work of breathing      Hand Exam:  Inspection: there is varying amounts of swelling of the various DIP joints, with some erythema noted as well.   Right side involving fingers 2,3,5.  Left side mainly 5th finger and 3rd a bit.   There is some tightness with flexion range of motion, but is not particularly painful.  No extensor lag  Minimal if any tenderness  No nail deformities or masses.    X-rays:  Obtained today, bilateral hands , reviewed in the office with the patient today are normal      ASSESSMENT:   Possible inflammatory joint disease.  I doubt that this is a fungal infection or other ungual-cutaneous problem    PLAN:   Inflammatory screening labs ordered today.  ESR = 7  Rheum consult.  Could see the hand service if Rheum too backed up.  Naproxen 440 mg twice daily x 2 weeks with food  Try to avoid overly frequent hand washing. Consider intermittent usage of hand  instead.  Self examine other contact events to the fingers for a possible cause.    Return to clinic: as needed     ROB Thurman MD  Dept. Orthopedic Surgery  [facility]   "

## 2022-11-09 ENCOUNTER — ANCILLARY PROCEDURE (OUTPATIENT)
Dept: GENERAL RADIOLOGY | Facility: CLINIC | Age: 37
End: 2022-11-09
Attending: ORTHOPAEDIC SURGERY
Payer: COMMERCIAL

## 2022-11-09 ENCOUNTER — APPOINTMENT (OUTPATIENT)
Dept: LAB | Facility: CLINIC | Age: 37
End: 2022-11-09
Payer: COMMERCIAL

## 2022-11-09 ENCOUNTER — OFFICE VISIT (OUTPATIENT)
Dept: ORTHOPEDICS | Facility: CLINIC | Age: 37
End: 2022-11-09
Payer: COMMERCIAL

## 2022-11-09 VITALS
WEIGHT: 120 LBS | OXYGEN SATURATION: 100 % | SYSTOLIC BLOOD PRESSURE: 115 MMHG | BODY MASS INDEX: 17.77 KG/M2 | HEART RATE: 91 BPM | DIASTOLIC BLOOD PRESSURE: 79 MMHG | HEIGHT: 69 IN

## 2022-11-09 DIAGNOSIS — M25.50 ARTHRALGIA OF MULTIPLE JOINTS: ICD-10-CM

## 2022-11-09 DIAGNOSIS — M25.441 SWELLING OF HAND JOINT, RIGHT: ICD-10-CM

## 2022-11-09 DIAGNOSIS — M25.441 SWELLING OF HAND JOINT, RIGHT: Primary | ICD-10-CM

## 2022-11-09 LAB
CRP SERPL-MCNC: 4.5 MG/L (ref 0–8)
ERYTHROCYTE [SEDIMENTATION RATE] IN BLOOD BY WESTERGREN METHOD: 7 MM/HR (ref 0–20)

## 2022-11-09 PROCEDURE — 86039 ANTINUCLEAR ANTIBODIES (ANA): CPT

## 2022-11-09 PROCEDURE — 99203 OFFICE O/P NEW LOW 30 MIN: CPT | Performed by: ORTHOPAEDIC SURGERY

## 2022-11-09 PROCEDURE — 85652 RBC SED RATE AUTOMATED: CPT

## 2022-11-09 PROCEDURE — 86140 C-REACTIVE PROTEIN: CPT

## 2022-11-09 PROCEDURE — 86431 RHEUMATOID FACTOR QUANT: CPT

## 2022-11-09 PROCEDURE — 36415 COLL VENOUS BLD VENIPUNCTURE: CPT

## 2022-11-09 PROCEDURE — 73130 X-RAY EXAM OF HAND: CPT | Mod: TC | Performed by: RADIOLOGY

## 2022-11-09 PROCEDURE — 86038 ANTINUCLEAR ANTIBODIES: CPT

## 2022-11-09 PROCEDURE — 86200 CCP ANTIBODY: CPT

## 2022-11-09 RX ORDER — TAZAROTENE 1 MG/G
CREAM TOPICAL
COMMUNITY
Start: 2021-12-06

## 2022-11-09 ASSESSMENT — PAIN SCALES - GENERAL: PAINLEVEL: MILD PAIN (2)

## 2022-11-09 NOTE — LETTER
11/9/2022         RE: Michelle Espino  [address] [facility] [address]        Dear Colleague,    Thank you for referring your patient, Michelle Espino, to the [facility]. Please see a copy of my visit note below.    SUBJECTIVE:   Michelle Espino is a 37 year old female who is seen as self referral for evaluation of bilateral finger pain that has been present approximately 1 month. No known injury.    Present symptoms: problems started with the right side, with recent left sided issues  Both hands have had some pain, but mainly swelling, tightness with flexion, and some redness around the DIP joints dorsally only.   Right side involving fingers 2,3,5.  Left side mainly 5th finger and 3rd a bit.     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   Doesn't swim regularly, and is not exposed to chemical cleaning solutions.  No history of inflammatory joint disease. No history of psoriasis or other skin condition.    Review of Systems:  Constitutional:  NEGATIVE for fever, chills, change in weight  Integumentary/Skin:  NEGATIVE for worrisome rashes, moles or lesions  Eyes:  NEGATIVE for vision changes or irritation  ENT/Mouth:  NEGATIVE for ear, mouth and throat problems  Resp:  NEGATIVE for significant cough or SOB  Breast:  NEGATIVE for masses, tenderness or discharge  CV:  NEGATIVE for chest pain, palpitations or peripheral edema  GI:  NEGATIVE for nausea, abdominal pain, heartburn, or change in bowel habits  :  Negative   Musculoskeletal:  See HPI above  Neuro:  NEGATIVE for weakness, dizziness or paresthesias  Endocrine:  NEGATIVE for temperature intolerance, skin/hair changes  Heme/allergy/immune:  NEGATIVE for bleeding problems  Psychiatric:  NEGATIVE for changes in mood or affect    Past Medical History: No past medical history on file.  Past Surgical History: No past surgical  "history on file.  Family History: No family history on file.  Social History:   Social History     Tobacco Use     Smoking status: Never     Smokeless tobacco: Never   Substance Use Topics     Alcohol use: Not on file       OBJECTIVE:  Physical Exam:  /79 (BP Location: Left arm, Patient Position: Sitting, Cuff Size: Adult Regular)   Pulse 91   Ht 1.753 m (5' 9\")   Wt 54.4 kg (120 lb)   SpO2 100%   BMI 17.72 kg/m    General Appearance: healthy, alert and no distress   Skin: no suspicious lesions or rashes  Neuro: Normal strength and tone, mentation intact and speech normal  Vascular: good pulses, and cappillary refill   Lymph: no lymphadenopathy   Psych:  mentation appears normal and affect normal/bright  Resp: no increased work of breathing      Hand Exam:  Inspection: there is varying amounts of swelling of the various DIP joints, with some erythema noted as well.   Right side involving fingers 2,3,5.  Left side mainly 5th finger and 3rd a bit.   There is some tightness with flexion range of motion, but is not particularly painful.  No extensor lag  Minimal if any tenderness  No nail deformities or masses.    X-rays:  Obtained today, bilateral hands , reviewed in the office with the patient today are normal      ASSESSMENT:   Possible inflammatory joint disease.  I doubt that this is a fungal infection or other ungual-cutaneous problem    PLAN:   Inflammatory screening labs ordered today.  ESR = 7  Rheum consult.  Could see the hand service if Rheum too backed up.  Naproxen 440 mg twice daily x 2 weeks with food  Try to avoid overly frequent hand washing. Consider intermittent usage of hand  instead.  Self examine other contact events to the fingers for a possible cause.    Return to clinic: as needed     ROB Thurman MD  Dept. Orthopedic Surgery  [facility]       Again, thank you for allowing me to participate in the care of your patient.        Sincerely,        Chad Gramajo " MD [place]

## 2022-11-10 LAB
ANA PAT SER IF-IMP: ABNORMAL
ANA SER QL IF: ABNORMAL
ANA TITR SER IF: ABNORMAL {TITER}
RHEUMATOID FACT SER NEPH-ACNC: <6 IU/ML

## 2022-11-11 LAB — CCP AB SER IA-ACNC: 1 U/ML

## 2022-12-05 DIAGNOSIS — M25.549 PAIN IN MULTIPLE FINGER JOINTS: ICD-10-CM

## 2022-12-05 DIAGNOSIS — M19.90 INFLAMMATORY ARTHRITIS: Primary | ICD-10-CM

## 2023-05-08 ENCOUNTER — HEALTH MAINTENANCE LETTER (OUTPATIENT)
Age: 38
End: 2023-05-08

## 2023-07-20 ENCOUNTER — OFFICE VISIT (OUTPATIENT)
Dept: RHEUMATOLOGY | Facility: CLINIC | Age: 38
End: 2023-07-20
Attending: ORTHOPAEDIC SURGERY
Payer: COMMERCIAL

## 2023-07-20 VITALS
OXYGEN SATURATION: 100 % | RESPIRATION RATE: 18 BRPM | BODY MASS INDEX: 17.72 KG/M2 | WEIGHT: 120 LBS | HEART RATE: 77 BPM | DIASTOLIC BLOOD PRESSURE: 70 MMHG | SYSTOLIC BLOOD PRESSURE: 105 MMHG

## 2023-07-20 DIAGNOSIS — I73.00 RAYNAUD'S DISEASE WITHOUT GANGRENE: ICD-10-CM

## 2023-07-20 DIAGNOSIS — R76.8 POSITIVE ANA (ANTINUCLEAR ANTIBODY): Primary | ICD-10-CM

## 2023-07-20 DIAGNOSIS — M79.89 FINGER SWELLING: ICD-10-CM

## 2023-07-20 DIAGNOSIS — R53.83 FATIGUE, UNSPECIFIED TYPE: ICD-10-CM

## 2023-07-20 LAB
ALBUMIN MFR UR ELPH: <6 MG/DL
ALBUMIN SERPL BCG-MCNC: 5 G/DL (ref 3.5–5.2)
ALBUMIN UR-MCNC: NEGATIVE MG/DL
ALP SERPL-CCNC: 71 U/L (ref 35–104)
ALT SERPL W P-5'-P-CCNC: 13 U/L (ref 0–50)
ANION GAP SERPL CALCULATED.3IONS-SCNC: 14 MMOL/L (ref 7–15)
APPEARANCE UR: CLEAR
AST SERPL W P-5'-P-CCNC: 24 U/L (ref 0–45)
BASOPHILS # BLD AUTO: 0 10E3/UL (ref 0–0.2)
BASOPHILS NFR BLD AUTO: 1 %
BILIRUB SERPL-MCNC: 0.5 MG/DL
BILIRUB UR QL STRIP: NEGATIVE
BUN SERPL-MCNC: 8.6 MG/DL (ref 6–20)
CALCIUM SERPL-MCNC: 9.6 MG/DL (ref 8.6–10)
CHLORIDE SERPL-SCNC: 102 MMOL/L (ref 98–107)
CK SERPL-CCNC: 59 U/L (ref 26–192)
COLOR UR AUTO: YELLOW
CREAT SERPL-MCNC: 0.87 MG/DL (ref 0.51–0.95)
CREAT UR-MCNC: 29.1 MG/DL
CRP SERPL-MCNC: <3 MG/L
DEPRECATED HCO3 PLAS-SCNC: 24 MMOL/L (ref 22–29)
EOSINOPHIL # BLD AUTO: 0.1 10E3/UL (ref 0–0.7)
EOSINOPHIL NFR BLD AUTO: 2 %
ERYTHROCYTE [DISTWIDTH] IN BLOOD BY AUTOMATED COUNT: 12.3 % (ref 10–15)
ERYTHROCYTE [SEDIMENTATION RATE] IN BLOOD BY WESTERGREN METHOD: 5 MM/HR (ref 0–20)
GFR SERPL CREATININE-BSD FRML MDRD: 88 ML/MIN/1.73M2
GLUCOSE SERPL-MCNC: 90 MG/DL (ref 70–99)
GLUCOSE UR STRIP-MCNC: NEGATIVE MG/DL
HCT VFR BLD AUTO: 43.8 % (ref 35–47)
HGB BLD-MCNC: 14.3 G/DL (ref 11.7–15.7)
HGB UR QL STRIP: NEGATIVE
IMM GRANULOCYTES # BLD: 0 10E3/UL
IMM GRANULOCYTES NFR BLD: 0 %
KETONES UR STRIP-MCNC: NEGATIVE MG/DL
LEUKOCYTE ESTERASE UR QL STRIP: NEGATIVE
LYMPHOCYTES # BLD AUTO: 1.2 10E3/UL (ref 0.8–5.3)
LYMPHOCYTES NFR BLD AUTO: 26 %
MCH RBC QN AUTO: 31.2 PG (ref 26.5–33)
MCHC RBC AUTO-ENTMCNC: 32.6 G/DL (ref 31.5–36.5)
MCV RBC AUTO: 95 FL (ref 78–100)
MONOCYTES # BLD AUTO: 0.3 10E3/UL (ref 0–1.3)
MONOCYTES NFR BLD AUTO: 7 %
NEUTROPHILS # BLD AUTO: 3 10E3/UL (ref 1.6–8.3)
NEUTROPHILS NFR BLD AUTO: 64 %
NITRATE UR QL: NEGATIVE
PH UR STRIP: 7 [PH] (ref 5–7)
PLATELET # BLD AUTO: 327 10E3/UL (ref 150–450)
POTASSIUM SERPL-SCNC: 4.2 MMOL/L (ref 3.4–5.3)
PROT SERPL-MCNC: 7.6 G/DL (ref 6.4–8.3)
PROT/CREAT 24H UR: NORMAL MG/G{CREAT}
RBC # BLD AUTO: 4.59 10E6/UL (ref 3.8–5.2)
SODIUM SERPL-SCNC: 140 MMOL/L (ref 136–145)
SP GR UR STRIP: 1.01 (ref 1–1.03)
T4 FREE SERPL-MCNC: 1.15 NG/DL (ref 0.9–1.7)
TSH SERPL DL<=0.005 MIU/L-ACNC: 4.88 UIU/ML (ref 0.3–4.2)
UROBILINOGEN UR STRIP-ACNC: 0.2 E.U./DL
WBC # BLD AUTO: 4.7 10E3/UL (ref 4–11)

## 2023-07-20 PROCEDURE — 86800 THYROGLOBULIN ANTIBODY: CPT | Performed by: INTERNAL MEDICINE

## 2023-07-20 PROCEDURE — 86147 CARDIOLIPIN ANTIBODY EA IG: CPT | Performed by: INTERNAL MEDICINE

## 2023-07-20 PROCEDURE — 86225 DNA ANTIBODY NATIVE: CPT | Performed by: INTERNAL MEDICINE

## 2023-07-20 PROCEDURE — 85390 FIBRINOLYSINS SCREEN I&R: CPT | Performed by: PATHOLOGY

## 2023-07-20 PROCEDURE — 86160 COMPLEMENT ANTIGEN: CPT | Performed by: INTERNAL MEDICINE

## 2023-07-20 PROCEDURE — 86140 C-REACTIVE PROTEIN: CPT | Performed by: INTERNAL MEDICINE

## 2023-07-20 PROCEDURE — 84156 ASSAY OF PROTEIN URINE: CPT | Performed by: INTERNAL MEDICINE

## 2023-07-20 PROCEDURE — 86376 MICROSOMAL ANTIBODY EACH: CPT | Performed by: INTERNAL MEDICINE

## 2023-07-20 PROCEDURE — 99204 OFFICE O/P NEW MOD 45 MIN: CPT | Performed by: INTERNAL MEDICINE

## 2023-07-20 PROCEDURE — 86146 BETA-2 GLYCOPROTEIN ANTIBODY: CPT | Performed by: INTERNAL MEDICINE

## 2023-07-20 PROCEDURE — 85025 COMPLETE CBC W/AUTO DIFF WBC: CPT | Performed by: INTERNAL MEDICINE

## 2023-07-20 PROCEDURE — 86235 NUCLEAR ANTIGEN ANTIBODY: CPT | Performed by: INTERNAL MEDICINE

## 2023-07-20 PROCEDURE — 84443 ASSAY THYROID STIM HORMONE: CPT | Performed by: INTERNAL MEDICINE

## 2023-07-20 PROCEDURE — 84439 ASSAY OF FREE THYROXINE: CPT | Performed by: INTERNAL MEDICINE

## 2023-07-20 PROCEDURE — 82550 ASSAY OF CK (CPK): CPT | Performed by: INTERNAL MEDICINE

## 2023-07-20 PROCEDURE — 80053 COMPREHEN METABOLIC PANEL: CPT | Performed by: INTERNAL MEDICINE

## 2023-07-20 PROCEDURE — 85730 THROMBOPLASTIN TIME PARTIAL: CPT | Performed by: INTERNAL MEDICINE

## 2023-07-20 PROCEDURE — 36415 COLL VENOUS BLD VENIPUNCTURE: CPT | Performed by: INTERNAL MEDICINE

## 2023-07-20 PROCEDURE — 85652 RBC SED RATE AUTOMATED: CPT | Performed by: INTERNAL MEDICINE

## 2023-07-20 PROCEDURE — 81003 URINALYSIS AUTO W/O SCOPE: CPT | Performed by: INTERNAL MEDICINE

## 2023-07-20 PROCEDURE — 85613 RUSSELL VIPER VENOM DILUTED: CPT | Performed by: INTERNAL MEDICINE

## 2023-07-20 NOTE — PATIENT INSTRUCTIONS
RHEUMATOLOGY    [facility]  [address] [place] [address]  [place] [address]    Phone number: [phone]  Fax number: [phone]    If you need a medication refill, please contact us as you may need lab work and/or a follow up visit prior to your refill.      Thank you for choosing [facility]!    Kae Mays CMA Rheumatology

## 2023-07-20 NOTE — PROGRESS NOTES
Rheumatology Clinic Visit      Michelle Espino MRN# [id number]   YOB: 1985 Age: 38 year old      Date of visit: 7/20/23   Referring provider: Dr. Chad Thurman    Chief Complaint   Patient presents with:  Consult: In the winter patients fingers would go white and numb. Swelling in fingers also (does have pictures of them)    Assessment and Plan     1.  JOSSUE 1:40 speckled, Raynaud's phenomenon, dry eyes, photosensitive rash (arms), and history of finger swelling: In October 2022 when she had significant stresses at work she reports that she had swelling of the fingers just distal to the left second and third DIPs and later swelling of the left second-fifth and right third fingers distal to the DIPs (pictures on her phone show what she had described, with swelling distal to the DIP joint).  The swelling of her fingers has since resolved.  Then in November 2022 she developed Raynaud's phenomenon symptoms affecting her fingers.  Also new in the last year is a feeling of dryness in her eyes requiring artificial tears as needed and sometimes she cannot wear her contacts.  No dry mouth.  She has developed rashes on her arms with sun exposure so she uses sunscreen and covers up; the rash may last for only 1 day or so.  States that she has fatigue but she has attributed it to having a 3-year-old and a 5-year-old at home.  So in summary she has Raynaud's phenomenon with a normal nailfold capillaroscopy on 7/20/2023, history of swelling distal to the DIPs that has since resolved, photosensitive pruritic rash affecting her arms, and intermittent mild dry eyes that are better with using artificial tears.  This is suspicious for a developing connective tissue disease and if there is enough serologic evidence to start hydroxychloroquine then it will be started at 200 mg daily and this was discussed with the patient in detail today and she is in agreement with this plan.  If there is not sufficient serologic  evidence to suggest a connective tissue disease then hydroxychloroquine will not be started.  Plan to follow-up in clinic in early-to-mid-November to reassess, sooner if needed.  Note also that the swelling of the fingers distal to the DIPs was by description of the patient and from what could be seen on pictures on her phone; if the swelling was actually at the DIPs then this would raise suspicion for a spondyloarthritis; she has no active inflammatory arthritis symptoms at this time. Undiagnosed new problem with uncertain prognosis   - Labs today: CBC, CMP, ESR, CRP, LIMA, dsDNA, C3, C4, Scl-70, TSH, thyroglobulin antibody, thyroid peroxidase antibody, CK, cardiolipin, lupus anticoagulant, beta-2 glycoprotein, UA, Uprotein:creatinine  - Continue following with Dr. Poonam Short at [facility] in [place]    # Hydroxychloroquine (Plaquenil) Risks and Benefits:  The risks and benefits of hydroxychloroquine were discussed in detail and the patient verbalized understanding; the patient also verbalized agreement to get the required ophthalmologic toxicity monitoring.  The risks discussed include, but are not limited to, the risk for hypersensitivity, anaphylaxis, anaphylactoid reactions, irreversible retinal damage, rare hematologic reactions, and rare cardiomyopathy.  Patients with G6PD deficiency or hepatic impairment may be at an increased risk for adverse effects.  I encouraged reviewing the package insert and asking any questions about the medication.       Total minutes spent in evaluation with patient, documentation, , and review of pertinent studies and chart notes: 54     Ms. Espino verbalized agreement with and understanding of the rational for the diagnosis and treatment plan.  All questions were answered to best of my ability and the patient's satisfaction. Ms. Espino was advised to contact the clinic with any questions that may arise after the clinic visit.      Thank you for involving me  in the care of the patient    Return to clinic: November 2023      SAMANTHA   Michelle Espino is a 38 year old female otherwise healthy who presents for initial evaluation of positive JOSSUE and finger swelling    Today, 7/20/2023: Patient reports that on October 9, 2022 she had swelling of the left second and third fingers just distal to the DIPs, followed by development of swelling of the second-fifth fingers just distal to the DIPs and of the right third finger distal to the DIP.  There was swelling without much pain and she shows pictures on her phone that is consistent with this description.  She reports that the swelling has since resolved and she has no pain of her fingers.  In November 2022 she started to have sharply demarcated white discoloration on the distal aspects of the fingers bilaterally, followed by purple/blue hue and then redness that is consistent with Raynaud's phenomenon and would occur with cold exposure.  She notes having a rash on her arms if she goes out in the sun so she uses sunscreen and covers up; the rash on her arms is pruritic.  Rash on her arms may last for a day or so.  More agitation in her eyes with contacts that she attributes to dry eyes so she uses artificial tears 1-3 times per day as needed.  States that she has fatigue that she attributes to having a 3-year-old and a 5-year-old at home.  Notes that when all of this started she had significant stress at her job.  She also notes that once that she had a raised pruritic rash on the dorsal aspect of her hand that resolved spontaneously.    Denies fevers, chills, nausea, vomiting, constipation, diarrhea. No abdominal pain. No chest pain/pressure, palpitations, or shortness of breath. No LE swelling. No neck pain. No oral or nasal sores.   No dry mouth.  No photophobia. No history of uveitis.  No history of psoriasis.  No history of inflammatory bowel disease..  No history of DVT, pulmonary embolism, or miscarriage.   No history of  "serositis.   No known seizure disorder.  No known renal disorder.      Tobacco: none  EtOH: none  Drugs: none  Occupation: compliance oversight for [employer]    ROS   12 point review of system was completed and negative except as noted in the HPI     Active Problem List     Patient Active Problem List   Diagnosis   (none) - all problems resolved or deleted     Past Medical History   History reviewed. No pertinent past medical history.  Past Surgical History   History reviewed. No pertinent surgical history.  Allergy     Allergies   Allergen Reactions     Amoxicillin Hives     PN: LW Reaction: rash       Cefdinir Other (See Comments)     dehydration     Cefuroxime      PN: LW Reaction: sever n/v     Current Medication List     Current Outpatient Medications   Medication Sig     tazarotene (TAZORAC) 0.1 % external cream      No current facility-administered medications for this visit.       Social History   See HPI    Family History   Denies family history of rheumatologic disease    Physical Exam     Temp Readings from Last 3 Encounters:   No data found for Temp     BP Readings from Last 5 Encounters:   07/20/23 105/70   11/09/22 115/79   11/20/20 111/75   03/02/20 102/64     Pulse Readings from Last 1 Encounters:   07/20/23 77     Resp Readings from Last 1 Encounters:   07/20/23 18     Estimated body mass index is 17.72 kg/m  as calculated from the following:    Height as of 11/9/22: 1.753 m (5' 9\").    Weight as of this encounter: 54.4 kg (120 lb).    GEN: NAD. Healthy appearing adult.   HEENT:  Anicteric, noninjected sclera. No obvious external lesions of the ear and nose. Hearing intact.  CV: S1, S2. RRR. No m/r/g  PULM: No increased work of breathing. CTA bilaterally   MSK: MCPs, PIPs, DIPs without swelling or tenderness to palpation.  Wrists without swelling or tenderness to palpation.  Elbows and shoulders without swelling or tenderness to palpation.   Knees, ankles, and MTPs without swelling or tenderness " to palpation.    SKIN: No rash or jaundice seen.  Normal nailfold capillaroscopy  PSYCH: Alert. Appropriate.     Labs / Imaging (select studies)     RF/CCP  Recent Labs   Lab Test 11/09/22 0941   CCPIGG 1.0   RHF <6     JOSSUE  Recent Labs   Lab Test 11/09/22 0941   LISANDRO Borderline Positive*   ANAP1 Homogeneous   ANAT1 1:40     ESR/CRP  Recent Labs   Lab Test 11/09/22 0941   SED 7   CRP 4.5     Immunization History     Immunization History   Administered Date(s) Administered     COVID-19 MONOVALENT 12+ (Pfizer) 11/23/2021     COVID-19 Monovalent 18+ (Moderna) 04/13/2021, 05/11/2021          Chart documentation done in part with Dragon Voice recognition Software. Although reviewed after completion, some word and grammatical error may remain.    Monty Plaza MD

## 2023-07-21 LAB
C3 SERPL-MCNC: 102 MG/DL (ref 81–157)
C4 SERPL-MCNC: 18 MG/DL (ref 13–39)
DRVVT SCREEN RATIO: 0.95
INR PPP: 1.06 (ref 0.85–1.15)
LA PPP-IMP: NEGATIVE
LUPUS INTERPRETATION: ABNORMAL
PLATELET NEUTRALIZATION: -5 SECONDS
PTT 1:2 MIX: 43 SECONDS (ref 31–45)
PTT RATIO: 1.38
THROMBIN TIME: 18.7 SECONDS (ref 13–19)
THYROGLOB AB SERPL IA-ACNC: <20 IU/ML
THYROPEROXIDASE AB SERPL-ACNC: 74 IU/ML

## 2023-07-24 LAB
B2 GLYCOPROT1 IGG SERPL IA-ACNC: 0.9 U/ML
B2 GLYCOPROT1 IGM SERPL IA-ACNC: <2.4 U/ML
CARDIOLIPIN IGG SER IA-ACNC: <2 GPL-U/ML
CARDIOLIPIN IGG SER IA-ACNC: NEGATIVE
CARDIOLIPIN IGM SER IA-ACNC: 2.7 MPL-U/ML
CARDIOLIPIN IGM SER IA-ACNC: NEGATIVE
DSDNA AB SER-ACNC: 1 IU/ML
ENA SCL70 IGG SER IA-ACNC: <0.6 U/ML
ENA SCL70 IGG SER IA-ACNC: NEGATIVE
ENA SM IGG SER IA-ACNC: <0.7 U/ML
ENA SM IGG SER IA-ACNC: NEGATIVE
ENA SS-A AB SER IA-ACNC: <0.5 U/ML
ENA SS-A AB SER IA-ACNC: NEGATIVE
ENA SS-B IGG SER IA-ACNC: <0.6 U/ML
ENA SS-B IGG SER IA-ACNC: NEGATIVE
U1 SNRNP IGG SER IA-ACNC: 1.1 U/ML
U1 SNRNP IGG SER IA-ACNC: NEGATIVE

## 2023-08-02 NOTE — RESULT ENCOUNTER NOTE
"[employer] message sent:  \" Michelle,    Rheumatology labs did not show enough serologic evidence to be diagnosed with a connective tissue disease. However, the thyroid peroxidase antibody is positive and the thyroid test TSH is mildly elevated.  These findings support  a diagnosis of an autoimmune hypothyroidism and although the TSH  elevation is mild it is possible that this is contributing to your fatigue.  I recommend you follow-up with your primary care provider regarding the elevated TSH and fatigue. Also note that the positive JOSSUE test could have been positive because of the positive thyroid peroxidase antibody test.  Regarding rheumatology plan: Because there was not enough serologic evidence to supported diagnosis of a connective tissue disease the plan is to follow-up later this year for reassessment, as we had discussed.    Sincerely,  Monty Plaza MD\"  "

## 2023-08-08 ENCOUNTER — TELEPHONE (OUTPATIENT)
Dept: RHEUMATOLOGY | Facility: CLINIC | Age: 38
End: 2023-08-08
Payer: COMMERCIAL

## 2023-08-08 NOTE — TELEPHONE ENCOUNTER
"RN: Michelle Espino did not read the result note sent by [employer] message based on notification from Epic stating that it was not read.  Therefore, please call Michelle to provide the information in the message.     \"   Michelle,     Rheumatology labs did not show enough serologic evidence to be diagnosed with a connective tissue disease. However, the thyroid peroxidase antibody is positive and the thyroid test TSH is mildly elevated.  These findings support  a diagnosis of an autoimmune hypothyroidism and although the TSH  elevation is mild it is possible that this is contributing to your fatigue.  I recommend you follow-up with your primary care provider regarding the elevated TSH and fatigue. Also note that the positive JOSSUE test could have been positive because of the positive thyroid peroxidase antibody test.  Regarding rheumatology plan: Because there was not enough serologic evidence to supported diagnosis of a connective tissue disease the plan is to follow-up later this year for reassessment, as we had discussed.     Sincerely,  Monty Plaza MD \"    "

## 2023-08-08 NOTE — LETTER
August 14, 2023      Michelle Espino  [address] [place] [address]        Dear ,    We are writing to inform you of your test results.    WE have been trying to reach you by phone, but have been unsuccessful. Please see your message below.     Rheumatology labs did not show enough serologic evidence to be diagnosed with a connective tissue disease. However, the thyroid peroxidase antibody is positive and the thyroid test TSH is mildly elevated.  These findings support  a diagnosis of an autoimmune hypothyroidism and although the TSH  elevation is mild it is possible that this is contributing to your fatigue.  I recommend you follow-up with your primary care provider regarding the elevated TSH and fatigue. Also note that the positive JOSSUE test could have been positive because of the positive thyroid peroxidase antibody test.  Regarding rheumatology plan: Because there was not enough serologic evidence to supported diagnosis of a connective tissue disease the plan is to follow-up later this year for reassessment, as we had discussed.     Sincerely,  Monty Plaza MD       If you have any questions or concerns, please call the clinic at the number listed above.

## 2023-08-09 NOTE — TELEPHONE ENCOUNTER
Called Ms. Espino and her phone went straight to voicemail. Left a message to return call to the clinic.    Wyatt RUBIO RN....8/9/2023 1:40 PM

## 2024-07-14 ENCOUNTER — HEALTH MAINTENANCE LETTER (OUTPATIENT)
Age: 39
End: 2024-07-14

## 2025-07-19 ENCOUNTER — HEALTH MAINTENANCE LETTER (OUTPATIENT)
Age: 40
End: 2025-07-19